# Patient Record
Sex: MALE
[De-identification: names, ages, dates, MRNs, and addresses within clinical notes are randomized per-mention and may not be internally consistent; named-entity substitution may affect disease eponyms.]

---

## 2018-08-26 ENCOUNTER — HOSPITAL ENCOUNTER (EMERGENCY)
Dept: HOSPITAL 88 - ER | Age: 48
Discharge: HOME | End: 2018-08-26
Payer: COMMERCIAL

## 2018-08-26 VITALS — HEIGHT: 70 IN | WEIGHT: 265 LBS | BODY MASS INDEX: 37.94 KG/M2

## 2018-08-26 DIAGNOSIS — S43.422A: ICD-10-CM

## 2018-08-26 DIAGNOSIS — J45.909: ICD-10-CM

## 2018-08-26 DIAGNOSIS — I10: ICD-10-CM
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
## 2018-08-26 DIAGNOSIS — M25.512: Primary | ICD-10-CM

## 2018-08-26 DIAGNOSIS — S43.52XA: ICD-10-CM
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
## 2018-08-26 PROCEDURE — 99283 EMERGENCY DEPT VISIT LOW MDM: CPT

## 2018-08-26 NOTE — DIAGNOSTIC IMAGING REPORT
SHOULDER LEFT COMPLETE - 3 views



HISTORY:  Pain. Trauma.

COMPARISON: None available.

     

FINDINGS:

Bones:

No acute displaced fracture.  

Osseous alignment is within normal limits.



Joints:

Mild DJD of AC joint.



Soft tissues:

The soft tissues appear unremarkable.





IMPRESSION: 

Mild DJD of AC joint.



Signed by: Dr. SAUL Corcoran M.D. on 8/26/2018 2:17 PM